# Patient Record
Sex: MALE | Race: WHITE | Employment: UNEMPLOYED | URBAN - METROPOLITAN AREA
[De-identification: names, ages, dates, MRNs, and addresses within clinical notes are randomized per-mention and may not be internally consistent; named-entity substitution may affect disease eponyms.]

---

## 2017-01-20 PROBLEM — H93.233 HYPERACUSIS, BILATERAL: Status: ACTIVE | Noted: 2017-01-20

## 2017-01-20 PROBLEM — H90.3 SENSORINEURAL HEARING LOSS (SNHL) OF BOTH EARS: Status: ACTIVE | Noted: 2017-01-20

## 2018-03-19 PROBLEM — E66.01 MORBID OBESITY DUE TO EXCESS CALORIES (HCC): Status: ACTIVE | Noted: 2018-03-19

## 2018-03-19 PROBLEM — E11.9 TYPE 2 DIABETES MELLITUS WITHOUT COMPLICATION, WITHOUT LONG-TERM CURRENT USE OF INSULIN (HCC): Status: ACTIVE | Noted: 2018-03-19

## 2019-04-08 PROCEDURE — 81001 URINALYSIS AUTO W/SCOPE: CPT | Performed by: FAMILY MEDICINE

## 2019-04-10 PROBLEM — D64.89 ANEMIA DUE TO OTHER CAUSE, NOT CLASSIFIED: Status: ACTIVE | Noted: 2019-04-10

## 2019-04-10 PROBLEM — E11.9 CONTROLLED TYPE 2 DIABETES MELLITUS WITHOUT COMPLICATION, WITHOUT LONG-TERM CURRENT USE OF INSULIN (HCC): Status: ACTIVE | Noted: 2019-04-10

## 2019-04-10 PROBLEM — E11.9 TYPE 2 DIABETES MELLITUS WITHOUT COMPLICATION, WITHOUT LONG-TERM CURRENT USE OF INSULIN (HCC): Status: RESOLVED | Noted: 2018-03-19 | Resolved: 2019-04-10

## 2019-10-05 ENCOUNTER — ANESTHESIA EVENT (OUTPATIENT)
Dept: ENDOSCOPY | Facility: HOSPITAL | Age: 59
End: 2019-10-05

## 2019-10-05 ENCOUNTER — HOSPITAL ENCOUNTER (OUTPATIENT)
Facility: HOSPITAL | Age: 59
Setting detail: OBSERVATION
Discharge: HOME | End: 2019-10-07
Attending: EMERGENCY MEDICINE | Admitting: INTERNAL MEDICINE
Payer: MEDICARE

## 2019-10-05 ENCOUNTER — APPOINTMENT (OUTPATIENT)
Dept: CT IMAGING | Facility: HOSPITAL | Age: 59
End: 2019-10-05
Attending: EMERGENCY MEDICINE
Payer: MEDICARE

## 2019-10-05 DIAGNOSIS — K92.2 GI BLEED: ICD-10-CM

## 2019-10-05 DIAGNOSIS — K62.5 RECTAL BLEEDING: Primary | ICD-10-CM

## 2019-10-05 DIAGNOSIS — D64.9 ANEMIA, UNSPECIFIED TYPE: ICD-10-CM

## 2019-10-05 DIAGNOSIS — K92.2 GASTROINTESTINAL HEMORRHAGE, UNSPECIFIED GASTROINTESTINAL HEMORRHAGE TYPE: ICD-10-CM

## 2019-10-05 DIAGNOSIS — R10.32 ABDOMINAL PAIN, LEFT LOWER QUADRANT: ICD-10-CM

## 2019-10-05 PROCEDURE — 99285 EMERGENCY DEPT VISIT HI MDM: CPT

## 2019-10-05 PROCEDURE — 83550 IRON BINDING TEST: CPT | Performed by: HOSPITALIST

## 2019-10-05 PROCEDURE — 86900 BLOOD TYPING SEROLOGIC ABO: CPT | Performed by: EMERGENCY MEDICINE

## 2019-10-05 PROCEDURE — 83540 ASSAY OF IRON: CPT | Performed by: HOSPITALIST

## 2019-10-05 PROCEDURE — C9113 INJ PANTOPRAZOLE SODIUM, VIA: HCPCS | Performed by: INTERNAL MEDICINE

## 2019-10-05 PROCEDURE — 74177 CT ABD & PELVIS W/CONTRAST: CPT | Performed by: EMERGENCY MEDICINE

## 2019-10-05 PROCEDURE — 82728 ASSAY OF FERRITIN: CPT | Performed by: HOSPITALIST

## 2019-10-05 PROCEDURE — 85018 HEMOGLOBIN: CPT | Performed by: INTERNAL MEDICINE

## 2019-10-05 PROCEDURE — 96361 HYDRATE IV INFUSION ADD-ON: CPT

## 2019-10-05 PROCEDURE — 80053 COMPREHEN METABOLIC PANEL: CPT | Performed by: EMERGENCY MEDICINE

## 2019-10-05 PROCEDURE — 85610 PROTHROMBIN TIME: CPT | Performed by: EMERGENCY MEDICINE

## 2019-10-05 PROCEDURE — 86850 RBC ANTIBODY SCREEN: CPT | Performed by: EMERGENCY MEDICINE

## 2019-10-05 PROCEDURE — 85025 COMPLETE CBC W/AUTO DIFF WBC: CPT | Performed by: EMERGENCY MEDICINE

## 2019-10-05 PROCEDURE — 86901 BLOOD TYPING SEROLOGIC RH(D): CPT | Performed by: EMERGENCY MEDICINE

## 2019-10-05 PROCEDURE — 85730 THROMBOPLASTIN TIME PARTIAL: CPT | Performed by: EMERGENCY MEDICINE

## 2019-10-05 PROCEDURE — 96360 HYDRATION IV INFUSION INIT: CPT

## 2019-10-05 RX ORDER — IBUPROFEN 200 MG
400 TABLET ORAL DAILY PRN
Status: ON HOLD | COMMUNITY
End: 2019-10-07

## 2019-10-05 RX ORDER — SODIUM CHLORIDE 9 MG/ML
INJECTION, SOLUTION INTRAVENOUS CONTINUOUS
Status: ACTIVE | OUTPATIENT
Start: 2019-10-05 | End: 2019-10-05

## 2019-10-05 RX ORDER — SODIUM CHLORIDE 9 MG/ML
INJECTION, SOLUTION INTRAVENOUS CONTINUOUS
Status: DISCONTINUED | OUTPATIENT
Start: 2019-10-05 | End: 2019-10-06

## 2019-10-05 NOTE — ED INITIAL ASSESSMENT (HPI)
rectal bleeding x2 days, LLQ abdominal pain. Blood started out dark, bright red now. No c/o pain. Hx developmental delay. Vitals stable in ambulance. Low hgb in Dr. Darvin Ahumada office last Tuesday. Scheduled for GI consult on 10/14 for colonoscopy.

## 2019-10-05 NOTE — ANESTHESIA PREPROCEDURE EVALUATION
Hospitalist PRE-OP EVALUATION    Patient Name: Sharee Mitchell    Pre-op Diagnosis: GI bleed [K92.2]    Procedure(s):  ESOPHAGOGASTRODUODENOSCOPY (EGD)    Surgeon(s) and Role:     Ct Nolan MD - Primary    Pre-op vitals reviewed.   Temp: 98 °F (36.7 °C)  Pulse: 85  R hemorrhage, unspecified gastrointestinal hemorrhage type Anemia, unspecified type   Abdominal pain, left lower quadrant        Past Surgical History:   Procedure Laterality Date   • COLONOSCOPY  4/11/2007    Dr Keanu Calderon   • COLONOSCOPY  05/18/2011    Dr. Faith Doyle to auscultation bilaterally. Other findings            ASA: 3   Plan: MAC  NPO status verified and patient meets guidelines. Plan/risks discussed with: patient and father            Discussed MAC anesthesia with patient.   Discussed r

## 2019-10-05 NOTE — H&P
[facility] hospitalist H+P  PCP Nicky Abraham MD  CC bleed  HPI 63 yo male with multiple medical problems including but not limited to mental retardation came with rectal bleeding x 2 days. Per pt the bleeding stopped.  He denies fever, denies chest pain, no SOB Social connections:        Talks on phone: Not on file        Gets together: Not on file        Attends Buddhist service: Not on file        Active member of club or organization: Not on file        Attends meetings of clubs or organizations: Not on file

## 2019-10-05 NOTE — PROGRESS NOTES
Patient had large maroon BM upon admission from ED. Dr. Preeti Orozco notified. TORB to start IV protonix, Hgb q 12 and EGD tomorrow. Patient and guardian updated on POC, questions answered.    Consent for procedure obtained from Oseguera Lenin, patient's riky

## 2019-10-05 NOTE — ED PROVIDER NOTES
Patient Seen in: [facility] Emergency Department      History   Patient presents with:  Bleeding (hematologic)    Stated Complaint: rectal bleed    HPI    38-year-old male presents emergency with chief complaint of rectal bleeding.   Patient states h 90   Resp 18   Temp 99.3 °F (37.4 °C)   Temp src Temporal   SpO2 100 %   O2 Device None (Room air)       Current:/86   Pulse 79   Temp 99.3 °F (37.4 °C) (Temporal)   Resp 20   Ht 177.8 cm (5' 10\")   Wt 110.9 kg   SpO2 98%   BMI 35.08 kg/m²         P CBC W/ DIFFERENTIAL[[id number]]          Abnormal            Final result                 Please view results for these tests on the individual orders. TYPE AND SCREEN    Narrative: The following orders were created for panel order TYPE AND SCREEN. 1:29 pm    Follow-up:  No follow-up provider specified. Medications Prescribed:  There are no discharge medications for this patient.                  Present on Admission  Date Reviewed: 9/12/2019          ICD-10-CM Noted POA    Rectal bleeding K62.5

## 2019-10-06 ENCOUNTER — ANESTHESIA (OUTPATIENT)
Dept: ENDOSCOPY | Facility: HOSPITAL | Age: 59
End: 2019-10-06

## 2019-10-06 PROCEDURE — 0DB68ZX EXCISION OF STOMACH, VIA NATURAL OR ARTIFICIAL OPENING ENDOSCOPIC, DIAGNOSTIC: ICD-10-PCS | Performed by: INTERNAL MEDICINE

## 2019-10-06 PROCEDURE — 85018 HEMOGLOBIN: CPT | Performed by: INTERNAL MEDICINE

## 2019-10-06 PROCEDURE — 88305 TISSUE EXAM BY PATHOLOGIST: CPT | Performed by: INTERNAL MEDICINE

## 2019-10-06 RX ORDER — MELATONIN
325
Status: DISCONTINUED | OUTPATIENT
Start: 2019-10-06 | End: 2019-10-07

## 2019-10-06 RX ORDER — PANTOPRAZOLE SODIUM 40 MG/1
40 TABLET, DELAYED RELEASE ORAL
Status: DISCONTINUED | OUTPATIENT
Start: 2019-10-06 | End: 2019-10-07

## 2019-10-06 NOTE — ANESTHESIA POSTPROCEDURE EVALUATION
Lakisha 34 Patient Status:  Observation   Age/Gender 62year old male MRN [id number]   Location [facility] Attending Richelle Nicole MD   Hosp Day # 0 PCP Nika Castillo MD       Anesthesia Post-op Note    Procedure(s):

## 2019-10-06 NOTE — PLAN OF CARE
Assumed patient care at 1900  Patient A&O x 4- developmentally delayed  No complaints of pain or discomfort at this time  VSS  Tele-SR  SCDs  BM overnight with Maroon colored stool noted  Monitoring HGB Q12- next draw at 0400  Protonix drip  Plan for EGD i appropriate  Outcome: Progressing  Goal: Free from bleeding injury  Description  (Example usage: patient with low platelets)  INTERVENTIONS:  - Avoid intramuscular injections, enemas and rectal medication administration  - Ensure safe mobilization of patie

## 2019-10-06 NOTE — OPERATIVE REPORT
[facility]                                                                                                        EGD Operative Report    Cheli Flow Patient Status:  Observation     Normal.   STOMACH:  A lot of heaped up inflammation in the antrum with 3 small ulcers found. No active bleeding. This was biopsied.    DUODENUM:  Normal.    Recommendations:   Ok to start diet  Watch h/h  protonix 40 mg bid  Repeat EGD with repeat colonos

## 2019-10-06 NOTE — CONSULTS
[facility]    Report of Consultation    Ilda Guevara Patient Status:  Observation    1960 MRN [id number]   [facility] ENDOSCOPY Attending Marilynn Iqbal MD   Hosp Day # 0 PCP Nicky Abraham MD     Date of Admission:  10/5/2019 Systems:  Gastrointestinal: Denies positive test for blood stool, heartburn/indigestion/reflux, belching, difficulty swallowing, irregular bowel habits, painful swallowing, diarrhea, abdominal pain, constipation, nausea, incontinence of stool, vomiting, bl excessive bleeding, enlarging or painful lymph nodes. Allergy: Denies medication allergy, latex/rubber allergy, anaphylactic or other reaction to anesthesia, food allergy. Eyes: Denies blurred/double vision, eye disease, glasses or contacts, glaucoma. classified     Rectal bleeding     Gastrointestinal hemorrhage, unspecified gastrointestinal hemorrhage type     Anemia, unspecified type     Abdominal pain, left lower quadrant      This is a 63 y/o with melena and drop in hgb.  protonix gtt  NPO  Transfu

## 2019-10-06 NOTE — PROGRESS NOTES
[facility] hospitalist daily note  Patient was seen/examined on 10/6/19    S; pt denies chest pain, no SOB, no abd pain, no nausea/emesis  Denies bleeding  EGD today, no active bleeding, 3 small ulcers stomach    Medications in Epic    PE    10/06/19  1205   BP:

## 2019-10-06 NOTE — PLAN OF CARE
Patient is A&Ox4. Hx of developmental delay. cooperative, follows commands. NSR on telemetry, VSS, afebrile. On RA. Hgb stable. Monitored every 12 hours. No bleeding observed. Abdomen is soft and round, non-tender with BS present.   Patient pasi

## 2019-10-07 ENCOUNTER — APPOINTMENT (OUTPATIENT)
Dept: ULTRASOUND IMAGING | Facility: HOSPITAL | Age: 59
End: 2019-10-07
Attending: HOSPITALIST
Payer: MEDICARE

## 2019-10-07 VITALS
BODY MASS INDEX: 35 KG/M2 | WEIGHT: 244.5 LBS | HEART RATE: 91 BPM | OXYGEN SATURATION: 98 % | HEIGHT: 70 IN | TEMPERATURE: 98 F | SYSTOLIC BLOOD PRESSURE: 125 MMHG | DIASTOLIC BLOOD PRESSURE: 68 MMHG | RESPIRATION RATE: 18 BRPM

## 2019-10-07 PROCEDURE — 83735 ASSAY OF MAGNESIUM: CPT | Performed by: HOSPITALIST

## 2019-10-07 PROCEDURE — 85018 HEMOGLOBIN: CPT | Performed by: INTERNAL MEDICINE

## 2019-10-07 PROCEDURE — 93970 EXTREMITY STUDY: CPT | Performed by: HOSPITALIST

## 2019-10-07 PROCEDURE — 85025 COMPLETE CBC W/AUTO DIFF WBC: CPT | Performed by: INTERNAL MEDICINE

## 2019-10-07 PROCEDURE — 80048 BASIC METABOLIC PNL TOTAL CA: CPT | Performed by: HOSPITALIST

## 2019-10-07 RX ORDER — DOCUSATE SODIUM 100 MG/1
100 CAPSULE, LIQUID FILLED ORAL 2 TIMES DAILY PRN
Qty: 30 CAPSULE | Refills: 0 | Status: SHIPPED | OUTPATIENT
Start: 2019-10-07

## 2019-10-07 RX ORDER — MELATONIN
325
Qty: 30 TABLET | Refills: 0 | Status: SHIPPED | OUTPATIENT
Start: 2019-10-08 | End: 2019-10-22

## 2019-10-07 RX ORDER — PANTOPRAZOLE SODIUM 40 MG/1
40 TABLET, DELAYED RELEASE ORAL
Qty: 60 TABLET | Refills: 0 | Status: SHIPPED | OUTPATIENT
Start: 2019-10-07 | End: 2019-12-23 | Stop reason: ALTCHOICE

## 2019-10-07 NOTE — PLAN OF CARE
Received patient lying in bed. AO, appropriate. Clear lungs. SR on tele. Denies having bloody stools, still tarry stools. Voids to the bathroom. Discharge planning today . Hgb q 12 hrs, latest one 8.       Problem: Patient/Family Goals  Goal: Patient/Family mobilization of patient  - Hold pressure on venipuncture sites to achieve adequate hemostasis  - Assess for signs and symptoms of internal bleeding  - Monitor lab trends  Outcome: Progressing

## 2019-10-07 NOTE — PLAN OF CARE
Patient is a&ox4.  and RA. Tele and NSR. Plan for discharge. Verified that US to B legs were negative for DVT. Family at bedside. NURSING DISCHARGE NOTE    Discharged Home via Ambulatory.   Accompanied by Family member and RN  Belongings Taken by

## 2019-10-07 NOTE — PROGRESS NOTES
[facility]  hospitalist daily note  Patient was seen/examined on 10/7/19     S; pt denies chest pain, no SOB, no abd pain, no nausea/emesis  Denies bleeding  Reports calf pain, no numbness/tingling     Medications in Epic     PE    10/07/19  1210   BP: 125/68   Pu

## 2019-10-08 NOTE — DISCHARGE SUMMARY
[facility]  Discharge Summary    [place] Patient Status:  Observation    1960 MRN [id number]   [facility] 3NE-A Attending No att. providers found   Hosp Day # 0 PCP Merna Mitchell MD     Date of Admission: 10/5/2019    Date updated at the bedside with pt's permission      History of Present Illness: 61 yo male with multiple medical problems including but not limited to mental retardation came with rectal bleeding x 2 days. Per pt the bleeding stopped.  He denies fever, denies blood work (CBC) at the follow up with Primary care provider    Per Gastroenterology you will need repeat EGD and C-scope as outpatient    Notify physician if you experience any of the followin. bleeding  2. Fever  3. persistent Nausea/vomiting  4.  se

## 2019-10-10 NOTE — PROGRESS NOTES
10/10/2019  Leticia Rascon    Dear Mariano Sommers,       Here are the biopsy/pathology findings from your recent EGD (Upper  Endoscopy):    a negative test for a bacteria called H-Pylori    Follow-up information:    If you

## 2019-10-17 PROBLEM — K27.9 PEPTIC ULCER: Status: ACTIVE | Noted: 2019-10-17

## 2019-10-17 PROBLEM — R10.32 ABDOMINAL PAIN, LEFT LOWER QUADRANT: Status: RESOLVED | Noted: 2019-10-05 | Resolved: 2019-10-17

## 2019-10-17 PROBLEM — K62.5 RECTAL BLEEDING: Status: RESOLVED | Noted: 2019-10-05 | Resolved: 2019-10-17

## 2020-03-05 PROBLEM — E66.9 OBESITY: Status: ACTIVE | Noted: 2020-03-05

## 2020-03-05 PROBLEM — D64.9 ANEMIA, UNSPECIFIED TYPE: Status: RESOLVED | Noted: 2019-10-05 | Resolved: 2020-03-05

## 2020-03-05 PROBLEM — K92.2 GASTROINTESTINAL HEMORRHAGE, UNSPECIFIED GASTROINTESTINAL HEMORRHAGE TYPE: Status: RESOLVED | Noted: 2019-10-05 | Resolved: 2020-03-05

## 2020-03-05 PROBLEM — D64.89 ANEMIA DUE TO OTHER CAUSE, NOT CLASSIFIED: Status: RESOLVED | Noted: 2019-04-10 | Resolved: 2020-03-05

## 2020-03-05 PROBLEM — D50.9 IRON DEFICIENCY ANEMIA: Status: ACTIVE | Noted: 2020-03-05

## 2020-03-05 PROBLEM — E66.01 MORBID OBESITY DUE TO EXCESS CALORIES (HCC): Status: RESOLVED | Noted: 2018-03-19 | Resolved: 2020-03-05

## 2020-03-05 PROBLEM — K27.9 PEPTIC ULCER: Status: RESOLVED | Noted: 2019-10-17 | Resolved: 2020-03-05

## 2020-03-12 PROBLEM — H90.3 SENSORINEURAL HEARING LOSS (SNHL) OF BOTH EARS: Status: RESOLVED | Noted: 2017-01-20 | Resolved: 2020-03-12

## 2021-07-08 PROBLEM — B35.1 ONYCHOMYCOSIS: Status: ACTIVE | Noted: 2021-07-08

## (undated) DEVICE — FORCEP BIOPSY RJ4 LG CAP W/ND

## (undated) DEVICE — Device: Brand: DEFENDO AIR/WATER/SUCTION AND BIOPSY VALVE

## (undated) DEVICE — 3M™ RED DOT™ MONITORING ELECTRODE WITH FOAM TAPE AND STICKY GEL, 50/BAG, 20/CASE, 72/PLT 2570: Brand: RED DOT™

## (undated) DEVICE — ENDOSCOPY PACK UPPER: Brand: MEDLINE INDUSTRIES, INC.

## (undated) DEVICE — 1200CC GUARDIAN II: Brand: GUARDIAN

## (undated) DEVICE — FILTERLINE NASAL ADULT O2/CO2

## (undated) NOTE — LETTER
10/10/2019          Wes Rascon    Dear Florence Gonzalez,       Here are the biopsy/pathology findings from your recent EGD (Upper  Endoscopy):    a negative test for a bacteria called H-Pylori    Follow-up information

## (undated) NOTE — LETTER
Neha Chen [address]  [place], [address] [facility]    Consent for Operation  Date: __________________                                Time: _______________    1.  I authorize the performance upon Zion Fuller the following operation: Esophagogast videotape. [facility] will not be responsible for storage or maintenance of this tape. 6. For the purpose of advancing medical education, I consent to the admittance of observers to the Operating Room.   7. I authorize the use of any specimen, organs, ti When the patient is a minor or mentally incompetent to give consent:  Signature of person authorized to consent for patient: ___________________________   Relationship to patient: ____________________________________________________    Signature of Witness these medicines may increase my risk of anesthetic complications. iv. If I am allergic to anything or have had a reaction to anesthesia before. 3. I understand how the anesthesia medicine will help me (benefits).   4. I understand that with any type of an patient’s representative) and answered their questions. The patient or their representative has agreed to have anesthesia services.     _____________________________________________________________________________  Witness        Date   Time  I have kassidy